# Patient Record
Sex: FEMALE | Race: OTHER | Employment: STUDENT | URBAN - METROPOLITAN AREA
[De-identification: names, ages, dates, MRNs, and addresses within clinical notes are randomized per-mention and may not be internally consistent; named-entity substitution may affect disease eponyms.]

---
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
## 2025-01-04 ENCOUNTER — OFFICE VISIT (OUTPATIENT)
Dept: FAMILY MEDICINE CLINIC | Facility: CLINIC | Age: 20
End: 2025-01-04
Payer: COMMERCIAL

## 2025-01-04 VITALS
WEIGHT: 200 LBS | HEART RATE: 107 BPM | BODY MASS INDEX: 32.14 KG/M2 | RESPIRATION RATE: 18 BRPM | DIASTOLIC BLOOD PRESSURE: 77 MMHG | OXYGEN SATURATION: 98 % | SYSTOLIC BLOOD PRESSURE: 118 MMHG | TEMPERATURE: 99 F | HEIGHT: 66 IN

## 2025-01-04 DIAGNOSIS — J01.00 ACUTE NON-RECURRENT MAXILLARY SINUSITIS: Primary | ICD-10-CM

## 2025-01-04 DIAGNOSIS — H92.01 ACUTE OTALGIA, RIGHT: ICD-10-CM

## 2025-01-04 PROCEDURE — 3074F SYST BP LT 130 MM HG: CPT | Performed by: FAMILY MEDICINE

## 2025-01-04 PROCEDURE — 3008F BODY MASS INDEX DOCD: CPT | Performed by: FAMILY MEDICINE

## 2025-01-04 PROCEDURE — 99213 OFFICE O/P EST LOW 20 MIN: CPT | Performed by: FAMILY MEDICINE

## 2025-01-04 PROCEDURE — 3078F DIAST BP <80 MM HG: CPT | Performed by: FAMILY MEDICINE

## 2025-01-04 NOTE — PROGRESS NOTES
CHIEF COMPLAINT:     Chief Complaint   Patient presents with    Ear Pain     Right ear pain, cough ,and runny nose . For two and half week   OTC: Dayquill, nyquill  Claritin and Nasal spray        HPI:   Madiha Stinson is a 19 year old female who presents for sinus congestion for 2.5 weeks.  Symptoms have been worsening since onset. Sinus congestion/pain is described as a pressure and is located mainly in the maxillary sinuses. Pt also reports right ear discomfort.  Reports thick, discolored nasal discharge. Has treated symptoms with otc claritin and flonase.  Patient also reports headache, cough. Denies fever, dental pain, tinnitus, N/V/D.        Current Outpatient Medications   Medication Sig Dispense Refill    amoxicillin clavulanate 875-125 MG Oral Tab Take 1 tablet by mouth 2 (two) times daily for 10 days. 20 tablet 0    predniSONE 20 MG Oral Tab TAKE 2 TABS BY MOUTH DAILY FOR 5 DAYS (Patient not taking: Reported on 10/12/2024) 10 tablet 0      Past Medical History:    Fracture    Left ankle fx      No past surgical history on file.   Family History   Problem Relation Age of Onset    High Cholesterol Father     Heart Disease Maternal Grandfather         age 56      Social History     Socioeconomic History    Marital status: Single   Tobacco Use    Smoking status: Never   Social History Narrative    ** Merged History Encounter **              REVIEW OF SYSTEMS:   GENERAL: feels well otherwise, no unplanned weight change,  normal appetite  SKIN: no rashes or abnormal skin lesions  HEENT: See HPI.    LUNGS: denies shortness of breath or wheezing, See HPI  CARDIOVASCULAR: denies chest pain or palpitations   GI: denies N/V/C or abdominal pain  NEURO: + sinus headaches.  No numbness or tingling in face.    EXAM:   /77 (BP Location: Left arm, Patient Position: Sitting, Cuff Size: adult)   Pulse 107   Temp 98.7 °F (37.1 °C) (Oral)   Resp 18   Ht 5' 6\" (1.676 m)   Wt 200 lb (90.7 kg)   LMP 12/12/2024  (Exact Date)   SpO2 98%   BMI 32.28 kg/m²   GENERAL: well developed, well nourished,in no apparent distress  SKIN: no rashes,no suspicious lesions  HEAD: atraumatic, normocephalic, + tenderness on palpation of maxillary sinuses  EYES: conjunctiva clear, EOM intact  EARS: TM's Right obscured by cerumen impaction. Left TM pearly, no bulging, no retraction, no fluid, bony landmarks present  NOSE: nostrils patent, thick, discolored nasal mucous, nasal mucosa reddened and boggy  THROAT: oral mucosa pink, moist. No visible dental caries. Posterior pharynx is not erythematous. no exudates.  NECK: supple, non-tender  LUNGS: clear to auscultation bilaterally, no wheezes or rhonchi. Breathing is non labored.  CARDIO: RRR without murmur  EXTREMITIES: no cyanosis, clubbing or edema  LYMPH:  no lymphadenopathy.    NEURO:  No focal deficits      ASSESSMENT AND PLAN:     Encounter Diagnoses   Name Primary?    Acute non-recurrent maxillary sinusitis Yes    Acute otalgia, right        No orders of the defined types were placed in this encounter.      Meds & Refills for this Visit:  Requested Prescriptions     Signed Prescriptions Disp Refills    amoxicillin clavulanate 875-125 MG Oral Tab 20 tablet 0     Sig: Take 1 tablet by mouth 2 (two) times daily for 10 days.           Risks, benefits, side effects of medication addressed and explained.    Patient Instructions   Take antibiotics with food and plenty of water.   Eat yogurt or take probiotic daily. (Probiotic-10 by "[employer]'s Bounty is a good example of an OTC probiotic)  Make sure to finish the entire antibiotic treatment.  Increase fluids and rest.   Use otc meds as needed.  Use debrox in right ear to soften hard wax  Monitor symptoms and follow-up with clinic at school if no better in 2-3 days.      The patient indicates understanding of these issues and agrees to the plan.

## 2025-01-04 NOTE — PATIENT INSTRUCTIONS
Take antibiotics with food and plenty of water.   Eat yogurt or take probiotic daily. (Probiotic-10 by [employer]'s Bounty is a good example of an OTC probiotic)  Make sure to finish the entire antibiotic treatment.  Increase fluids and rest.   Use otc meds as needed.  Use debrox in right ear to soften hard wax  Monitor symptoms and follow-up with clinic at school if no better in 2-3 days.

## (undated) NOTE — Clinical Note
Date: 5/22/2017    Patient Name: Linwood Reina          To Whom it may concern: This letter has been written at the patient's request. The above patient was seen at the [facility] for treatment of a medical condition on 5/22/17.   She ha

## (undated) NOTE — MR AVS SNAPSHOT
[facility]  [address] [facility]  [place] [facility] CRITICAL ACCESS [facility] [address]  [phone]               Thank you for choosing us for your health care visit with Britta Tsai PA-C. We are glad to serve you and happy to provide you with this summary of your visit.   P the steam.  · Saline nasal sprays and decongestant tablets help open a stuffy nose. Antihistamines can also help, but they can cause side effects such as drowsiness and drying of the eyes, nose, and mouth.   Soothe a sore throat and cough  · Gargle every 2  Allergies as of Mar 18, 2017     No Known Allergies                Today's Vital Signs     BP Pulse    100/62 mmHg (19 %, Z = -0.90 / 40 %, Z = -0.26*) 104    Temp Height    97.8 °F (36.6 °C) (Oral) 65\" (97 %*, Z = 1.81)    Weight BMI    141 lb 6.4 oz (96 accept and enjoy it. It is also important to encourage play time as soon as they start crawling and walking. As your children grow, continue to help them live a healthy active lifestyle.     To lead a healthy active life, families can strive to reach these

## (undated) NOTE — MR AVS SNAPSHOT
[facility] [address] [facility]  [address] [facility]  [place] [address]  [phone]               Thank you for choosing us for your health care visit with KAM Crum. We are glad to serve you and happy to provide you with this summary of your visit.   Please h · 1/4 teaspoon of salt in 1/2 cup of warm water  · An over-the-counter anesthetic gargle  Use Medication for More Relief  Over-the-counter medication can reduce sore throat symptoms.  Ask your pharmacist if you have questions about which medication to use: Sign up for [email or web address] access for your child. [email or web address] access allows you to view health information for your child from their recent   visit, view other health information and more.   To sign up or find more information on getting   Proxy Access to your child